# Patient Record
Sex: MALE
[De-identification: names, ages, dates, MRNs, and addresses within clinical notes are randomized per-mention and may not be internally consistent; named-entity substitution may affect disease eponyms.]

---

## 2022-09-15 PROBLEM — Z00.00 ENCOUNTER FOR PREVENTIVE HEALTH EXAMINATION: Status: ACTIVE | Noted: 2022-09-15

## 2022-09-16 ENCOUNTER — APPOINTMENT (OUTPATIENT)
Dept: ORTHOPEDIC SURGERY | Facility: CLINIC | Age: 67
End: 2022-09-16

## 2022-09-16 VITALS — HEIGHT: 70 IN | BODY MASS INDEX: 34.36 KG/M2 | WEIGHT: 240 LBS

## 2022-09-16 DIAGNOSIS — M25.561 PAIN IN RIGHT KNEE: ICD-10-CM

## 2022-09-16 DIAGNOSIS — Z86.73 PERSONAL HISTORY OF TRANSIENT ISCHEMIC ATTACK (TIA), AND CEREBRAL INFARCTION W/OUT RESIDUAL DEFICITS: ICD-10-CM

## 2022-09-16 DIAGNOSIS — Z86.79 PERSONAL HISTORY OF OTHER DISEASES OF THE CIRCULATORY SYSTEM: ICD-10-CM

## 2022-09-16 DIAGNOSIS — M25.562 PAIN IN RIGHT KNEE: ICD-10-CM

## 2022-09-16 DIAGNOSIS — M17.0 BILATERAL PRIMARY OSTEOARTHRITIS OF KNEE: ICD-10-CM

## 2022-09-16 DIAGNOSIS — M54.50 LOW BACK PAIN, UNSPECIFIED: ICD-10-CM

## 2022-09-16 DIAGNOSIS — Z86.39 PERSONAL HISTORY OF OTHER ENDOCRINE, NUTRITIONAL AND METABOLIC DISEASE: ICD-10-CM

## 2022-09-16 PROCEDURE — 72100 X-RAY EXAM L-S SPINE 2/3 VWS: CPT

## 2022-09-16 PROCEDURE — 99204 OFFICE O/P NEW MOD 45 MIN: CPT | Mod: 25

## 2022-09-16 PROCEDURE — 72170 X-RAY EXAM OF PELVIS: CPT

## 2022-09-16 PROCEDURE — 20610 DRAIN/INJ JOINT/BURSA W/O US: CPT | Mod: 50

## 2022-09-16 PROCEDURE — 73564 X-RAY EXAM KNEE 4 OR MORE: CPT | Mod: 50

## 2022-09-16 RX ORDER — TRIAMTERENE 50 MG/1
CAPSULE ORAL
Refills: 0 | Status: ACTIVE | COMMUNITY

## 2022-09-16 RX ORDER — ATORVASTATIN CALCIUM 80 MG/1
TABLET, FILM COATED ORAL
Refills: 0 | Status: ACTIVE | COMMUNITY

## 2022-09-16 RX ORDER — LISINOPRIL 30 MG/1
TABLET ORAL
Refills: 0 | Status: ACTIVE | COMMUNITY

## 2022-09-16 NOTE — PHYSICAL EXAM
[de-identified] : Constitutional:  [    ], alert and oriented, cooperative, in no acute distress.\par \par HEENT \par NC/AT.  Appearance: symmetric\par \par Neck/Back\par Straight without deformity or instability.  Good ROM.\par \par Chest/Respiratory \par Respiratory effort: no intercostal retractions or use of accessory muscles. Nonlabored Breathing\par \par Skin \par On inspection, warm and dry without rashes or lesions.\par \par Mental Status: \par Judgment, insight: intact\par Orientation: oriented to time, place, and person\par \par Neurological:\par Sensory and Motor are grossly intact throughout\par \par Left Knee\par \par Inspection:\par     Skin intact, no rashes or lesions\par     No Effusion\par     Non-tender to palpation over tibial tubercle, patella, medial and lateral joint line, and pes insertion.\par \par Range of Motion: Mild pain with ROM, Positive crepitus with ROM.\par 	Extension - 0 degrees\par 	Flexion - 115 degrees\par 	Alignment - Varus 5 degrees\par 	Extensor lag: None\par \par Stability:\par      Demonstrates no Varus or Valgus instability\par      Negative Anterior or Posterior drawer.\par      Negative Lachman's\par \par Patella: stable, tracks well. \par \par Right Knee\par \par Inspection:\par     Skin intact, no rashes or lesions\par     No Effusion\par     Non-tender to palpation over tibial tubercle, patella, medial and lateral joint line, and pes insertion.\par \par Range of Motion: Mild pain with ROM, Positive crepitus with ROM.\par 	Extension - 0 degrees\par 	Flexion - 120 degrees\par 	Alignment - Varus 5 degrees\par 	Extensor lag: None\par \par Stability:\par      Demonstrates no Varus or Valgus instability\par      Negative Anterior or Posterior drawer.\par      Negative Lachman's\par \par Patella: stable, tracks well. \par \par Gait: nonantalgic\par \par Neurologic Exam\par     Motor intact including 5/5 Extensor Hallucis Longus, 5/5 Flexor Hallucis Longus, 5/5 Tibialis Anterior and 5/5 Gastrocnemius\par     Sensation Intact to Light Touch including Saphenous, Sural, Superficial Peroneal, Deep Peroneal, Tibial nerve distributions\par \par Vascular Exam\par     Foot is warm and well perfused with 2+ Dorsalis Pedis Pulse \par \par No pain with range of motion of the bilateral hips.\par \par Spine Exam:\par No gross deformity\par No midline Tenderness to Palpation over the Cervical, Thoracic, Lumbar or Sacral spine\par No bony step offs\par Mild paraspinal muscle Tenderness\par Positive Straight Leg Raise\par \par Motor: \par                C5               C6               C7               C8               T1 \par R            5/5 5/5 5/5 5/5 5/5\par L            5/5 5/5 5/5 5/5 5/5\par \par                L2               L3               L4               L5               S1\par R            5/5 5/5 5/5 5/5 5/5\par L            5/5 5/5 5/5 5/5 5/5\par \par Sensory:\par                C5          C6          C7          C8          T1        (0=absent, 1=impaired, 2=normal, NT=not testable)\par R              2            2             2            2            2\par L              2            2             2            2            2\par \par                L2          L3          L4           L5          S1        (0=absent, 1=impaired, 2=normal, NT=not testable)\par R              2            2             2            2            2\par L              2            2             2            2            2  [de-identified] : XRay: XRays of the Left Knee (4 Views) taken in the office today and reviewed with the patient. XRays demonstrate bone on bone articulations in the medial and patellofemoral compartments with subchondral sclerosis, overlying osteophytes, all consistent with osteoarthritis. (my personal interpretation)\par \par XRay: XRays of the Right Knee (4 Views) taken in the office today and reviewed with the patient. XRays demonstrate bone on bone articulations in the medial and patellofemoral compartments with subchondral sclerosis, overlying osteophytes, all consistent with osteoarthritis. (my personal interpretation)\par \par XRay: XRays of the Pelvis (1 View) taken in the office today and discussed with the patient. XRays demonstrate joint space narrowing in the hip joint with subchondral sclerosis, all consistent with osteoarthritis. (my personal interpretation)\par \par XRay:  XRays of the Lumbar Spine] taken in the office today and discussed with the patient. XRays demonstrate no obvious fracture or dislocation. (my personal interpretation).\par  \par  \par

## 2022-09-16 NOTE — REVIEW OF SYSTEMS
[Joint Pain] : joint pain [Joint Stiffness] : joint stiffness [Joint Swelling] : no joint swelling [Fever] : no fever [Chills] : no chills [FreeTextEntry5] : No history of heart attacks [FreeTextEntry7] : No stomach issues [FreeTextEntry8] : No reported kidney issues [de-identified] : No fevers/chills. No erythema. No immunocompromise [de-identified] : No numbness/tingling. History of stroke [de-identified] : No history of diabetes [de-identified] : No history of blood clots. Has a history of a hemorrhagic stroke

## 2022-09-16 NOTE — HISTORY OF PRESENT ILLNESS
[Walking] : worsened by walking [Knee Flexion] : worsened with knee flexion [NSAIDs] : relieved by nonsteroidal anti-inflammatory drugs [de-identified] : Mr. Maldonado is a 67 year old male, PMH CVA (hemorrhagic), HTN, HLD, who presented to the office for evaluation of his bilateral knee pain. Patient has a history of longstanding bilateral knee pain for several years. He states that the pain is located throughout the bilateral knees, but is worse on the left than the right. Pain is worse with ambulation and activities, including walking and stair climbing. He has tried Meloxicam with moderate relief, but does not take it regularly. He has not tried physical therapy or injections for the knee pain. He reports some bilateral hip pain and lumbar back pain as well. No recent trauma. No fevers/chills. No numbness/tingling. No history of corticosteroid or lidocaine allergy. He did have a hemorrhagic stroke several years ago after an hypertensive emergency. He does not report any deficits following the stroke.

## 2022-09-16 NOTE — DISCUSSION/SUMMARY
[de-identified] : Mr. Maldonado is a 67 year old male who presented to the office for evaluation of his bilateral knee pain. Patient has a longstanding history of bilateral knee pain and it now affects his ambulation and activities. XRays showed bilateral varus knees with medial compartment osteoarthritis. Examination showed varus knees with some pain with ROM and positive crepitus. Discussed with patient the management of knee osteoarthritis, including operative and nonoperative management. Patient does not want surgery at this time. Discussed that nonoperative management would consist of physical therapy, injections and anti-inflammatories. Patient will restart his Meloxicam as prescribed by his primary care physician. He was given a referral to physical therapy. Patient would also like bilateral knee injections at this time. The knee injections were given using aseptic technique. Patient tolerated the procedure well. Patient would also like a neurology referral to establish care, since he has not follow up much since his stroke. He was given a referral to neurology. He will follow up in 3 months for re-evaluation. Patient understanding and in agreement with plan. All questions answered.\par \par Plan:\par - Corticosteroid injections given to the bilateral knees\par - Restart Meloxicam as per primary care physician\par - Physical therapy for bilateral knee osteoarthritis\par - Neurology referral to establish care\par - Follow up in 3 months\par \par Procedure Note:\par \par Bilateral knee corticosteroid injections were given today. Risk and benefits of the injection were discussed, including but not limited to infection, fat atrophy, skin discoloration, failure to achieve desired results and elevated blood sugars. \par The area was prepped separately in the usual sterile fashion. The injections were given with 1 cc Methylprednisolone and 3 cc 1% Lidocaine in each knee and the patient tolerated it well.\par \par Risk of cortisone injection are minimal but present. There is the rare risk of joint infection, skin and soft tissue thinning or whitening of the skin surrounding the injection site, thinning of nearby bone, or the risk of elevated blood glucose in diabetics. Diabetics receiving steroid injection should follow their blood sugars very closely for several days after receiving the injections.  In the event of uncontrolled elevated blood glucose, they should seek medical attention.\par \par There's some concern that repeated use of cortisone shots may cause deterioration of the cartilage within a joint. For this reason, doctors typically limit the number of cortisone shots in a joint. The limit varies depending on the joint and the reason for treatment.\par \par Cortisone shots usually include a corticosteroid medication and a local anesthetic. The local anesthetic helps to numb the joint at the time of the injection and last for several hours. The cortisone acts to relieve pain and inflammation but may take several days to begin to work. Some people do experience a cortisone flare after the injection and may have increased pain for 2 to 3 days after the injection, and then pain can begin to improve.\par \par Patient was instructed to call or return for any signs or symptoms of infection after an injection including increased pain, swelling, redness or fevers.

## 2022-09-19 ENCOUNTER — NON-APPOINTMENT (OUTPATIENT)
Age: 67
End: 2022-09-19

## 2022-12-16 ENCOUNTER — APPOINTMENT (OUTPATIENT)
Dept: ORTHOPEDIC SURGERY | Facility: CLINIC | Age: 67
End: 2022-12-16

## 2023-01-05 ENCOUNTER — APPOINTMENT (OUTPATIENT)
Dept: ORTHOPEDIC SURGERY | Facility: CLINIC | Age: 68
End: 2023-01-05
Payer: COMMERCIAL

## 2023-01-05 VITALS
HEIGHT: 70 IN | WEIGHT: 240 LBS | BODY MASS INDEX: 34.36 KG/M2 | SYSTOLIC BLOOD PRESSURE: 166 MMHG | DIASTOLIC BLOOD PRESSURE: 111 MMHG

## 2023-01-05 DIAGNOSIS — R20.2 PARESTHESIA OF SKIN: ICD-10-CM

## 2023-01-05 DIAGNOSIS — M25.512 PAIN IN LEFT SHOULDER: ICD-10-CM

## 2023-01-05 DIAGNOSIS — M65.20 CALCIFIC TENDINITIS, UNSPECIFIED SITE: ICD-10-CM

## 2023-01-05 PROCEDURE — 99214 OFFICE O/P EST MOD 30 MIN: CPT

## 2023-01-05 PROCEDURE — 73030 X-RAY EXAM OF SHOULDER: CPT | Mod: LT

## 2023-01-05 RX ORDER — MELOXICAM 15 MG/1
15 TABLET ORAL DAILY
Qty: 14 | Refills: 0 | Status: ACTIVE | COMMUNITY
Start: 2023-01-05 | End: 1900-01-01

## 2023-01-08 PROBLEM — M65.20 CALCIFIC TENDINITIS: Status: ACTIVE | Noted: 2023-01-08

## 2023-01-08 PROBLEM — R20.2 HAND PARESTHESIA: Status: ACTIVE | Noted: 2023-01-05

## 2023-01-08 PROBLEM — M25.512 LEFT SHOULDER PAIN: Status: ACTIVE | Noted: 2023-01-05

## 2023-01-08 NOTE — DISCUSSION/SUMMARY
[de-identified] : Mr. Madlonado is a 67-year-old male who presented to the office for evaluation of his left shoulder pain.  Patient has a longstanding history of left shoulder pain, since a shoulder dislocation about 30 years ago.  However, patient experienced worsening of his shoulder pain and sharp shoulder pain starting on 12/30/2022.  He also reported some hand paresthesias.  X-rays showed no obvious fractures or dislocations.  There was an ossific density over the anterior shoulder, consistent with calcific tendinitis.  Examination showed pain/weakness with resisted forward flexion and abduction.  Discussed with patient the examination and imaging findings.  Discussed with patient the potential etiologies of his arm pain including, but not limited to, calcific tendinitis, rotator cuff pathology, shoulder sprain, shoulder strain, and cervical spine pathology.  Discussed with the patient the management of his shoulder pain, including physical therapy and anti-inflammatories.  Patient was given referral to physical therapy.  He was given a prescription for meloxicam 15 mg daily for 14 days.  Discussed that due to his pain/weakness with forward flexion and abduction, it be beneficial to undergo further evaluation of his left shoulder pain with an MRI.  Left shoulder MRI was ordered.  Also discussed that due to patient's hand paresthesias, it be beneficial to undergo further evaluation by spine specialist.  Patient was given referral to Dr. Dobbs. Patient will follow-up in 4 weeks, pending results of the MRI.  Patient understanding and in agreement with the plan.  All questions answered\par \par Plan:\par - Physical Therapy for left shoulder pain\par - Meloxicam 15 mg daily for 14 days\par - Left shoulder MRI\par - Follow up with Dr. Dobbs for evaluation of hand paresthesias\par - Follow up in 4 weeks, pending results of the MRI\par

## 2023-01-08 NOTE — HISTORY OF PRESENT ILLNESS
[de-identified] : Mr. Maldonado is a 67-year-old male who presents to the office for evaluation of his left shoulder pain.  Patient states that he has a longstanding history of left shoulder pain and a history of a left shoulder dislocation about 30 years ago.  He states that he would not have occasional shoulder pain since that time.  However, patient started having sharp shoulder pain starting on 12/30/2022.  Patient states that the pain is improved over the last few days.  He has tried meloxicam, which helped with the pain.  He has also tried Tylenol, with some improvement.  He also reports some tingling in the left hand.  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  No recent trauma.  No fevers or chills.

## 2023-01-08 NOTE — PHYSICAL EXAM
[de-identified] : Constitutional: 67 year old male, alert and oriented, cooperative, in no acute distress.\par \par HEENT \par NC/AT.  Appearance: symmetric\par \par Neck/Back\par Straight without deformity or instability.  Good ROM.\par \par Chest/Respiratory \par Respiratory effort: no intercostal retractions or use of accessory muscles. Nonlabored Breathing\par \par Mental Status: \par Judgment, insight: intact\par Orientation: oriented to time, place, and person\par \par Neurological:\par Sensory and Motor are grossly intact throughout\par \par Left Shoulder\par \par Inspection:\par     Skin intact, no rashes or lesions\par     Non-tender to palpation over AC Joint\par     Mild tenderness over the rotator cuff/deltoid and over the biceps tendon\par \par Range of Motion:\par 	Abduction - 80\par 	Forward flexion - 80\par                 IR: S1\par                 ER: 30\par \par Shoulder Testing\par      Weakness/pain with Empty Can/Job Test\par      No pain with impingement tests\par      No pain with IR shoulder\par      Pain with ER of the shoulder\par \par Neurologic Exam\par     Motor intact including 5/5 AIN/PIN/Median/Radial/Ulnar Nerve Distributions\par     Sensation Intact to Light Touch including Median/Radial/Ulnar nerve distributions\par \par Vascular Exam\par     Hand is warm and well perfused with 2+ Radial Pulse \par \par No cervical paraspinal muscle tenderness.  [de-identified] : XRay:  XRays of the Left Shoulder (3 Views0 taken in the office today and discussed with the patient. XRays demonstrate no obvious fracture or dislocation. There is no significant evidence of osteoarthritis or osteophyte formation.  There is an ossific density over the anterior shoulder, consistent with calcific tendinitis.  (my personal interpretation).\par

## 2023-01-08 NOTE — REVIEW OF SYSTEMS
[Joint Pain] : joint pain [Negative] : Endocrine [Joint Stiffness] : no joint stiffness [Joint Swelling] : no joint swelling [Fever] : no fever [Chills] : no chills [de-identified] : History of CVA

## 2023-01-23 ENCOUNTER — APPOINTMENT (OUTPATIENT)
Dept: ORTHOPEDIC SURGERY | Facility: CLINIC | Age: 68
End: 2023-01-23